# Patient Record
Sex: MALE | Race: WHITE | NOT HISPANIC OR LATINO | Employment: FULL TIME | URBAN - METROPOLITAN AREA
[De-identification: names, ages, dates, MRNs, and addresses within clinical notes are randomized per-mention and may not be internally consistent; named-entity substitution may affect disease eponyms.]

---

## 2022-11-30 ENCOUNTER — DOCUMENTATION (OUTPATIENT)
Dept: DIABETES SERVICES | Facility: HOSPITAL | Age: 57
End: 2022-11-30

## 2022-11-30 NOTE — PLAN OF CARE
CV completed today to address available closed loop systems using the [employer] CGM. Pamphlets given for each product and questions answered. I encouraged patient to contact insurance regarding cost and coverage, download simulator apps and to research which would meet his needs. He will contact his provider once he has made his decision. He has also been encouraged to reach out to us at this office for any additional support.